# Patient Record
Sex: FEMALE | HISPANIC OR LATINO | URBAN - METROPOLITAN AREA
[De-identification: names, ages, dates, MRNs, and addresses within clinical notes are randomized per-mention and may not be internally consistent; named-entity substitution may affect disease eponyms.]

---
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
## 2023-08-04 NOTE — PROCEDURE: TREATMENT REGIMEN
What Is The Reason For Today's Visit?: Full Body Skin Examination
What Is The Reason For Today's Visit? (Being Monitored For X): concerning skin lesions on an annual basis
Initiate Treatment: Dapsone 5% gel Qam
Detail Level: Zone
Continue Regimen: Minocycline 100 mg po bid\\nTretinoin 0.05% cream QHS